# Patient Record
Sex: FEMALE | Race: OTHER | HISPANIC OR LATINO | URBAN - METROPOLITAN AREA
[De-identification: names, ages, dates, MRNs, and addresses within clinical notes are randomized per-mention and may not be internally consistent; named-entity substitution may affect disease eponyms.]

---

## 2024-10-15 ENCOUNTER — APPOINTMENT (OUTPATIENT)
Dept: OBGYN | Facility: CLINIC | Age: 39
End: 2024-10-15
Payer: MEDICAID

## 2024-10-15 ENCOUNTER — INITIAL PRENATAL (OUTPATIENT)
Dept: OBGYN | Facility: CLINIC | Age: 39
End: 2024-10-15

## 2024-10-15 ENCOUNTER — HOSPITAL ENCOUNTER (OUTPATIENT)
Facility: MEDICAL CENTER | Age: 39
End: 2024-10-15
Attending: MIDWIFE
Payer: COMMERCIAL

## 2024-10-15 ENCOUNTER — ANCILLARY PROCEDURE (OUTPATIENT)
Dept: OBGYN | Facility: CLINIC | Age: 39
End: 2024-10-15
Payer: MEDICAID

## 2024-10-15 VITALS — SYSTOLIC BLOOD PRESSURE: 116 MMHG | WEIGHT: 154 LBS | DIASTOLIC BLOOD PRESSURE: 66 MMHG

## 2024-10-15 DIAGNOSIS — O23.599 BACTERIAL VAGINOSIS IN PREGNANCY: ICD-10-CM

## 2024-10-15 DIAGNOSIS — O26.892 VAGINAL DISCHARGE DURING PREGNANCY IN SECOND TRIMESTER: ICD-10-CM

## 2024-10-15 DIAGNOSIS — N91.2 AMENORRHEA, UNSPECIFIED: ICD-10-CM

## 2024-10-15 DIAGNOSIS — O09.529 ANTEPARTUM MULTIGRAVIDA OF ADVANCED MATERNAL AGE: ICD-10-CM

## 2024-10-15 DIAGNOSIS — Z34.80 SUPERVISION OF OTHER NORMAL PREGNANCY, ANTEPARTUM: ICD-10-CM

## 2024-10-15 DIAGNOSIS — N89.8 VAGINAL DISCHARGE DURING PREGNANCY IN SECOND TRIMESTER: ICD-10-CM

## 2024-10-15 DIAGNOSIS — B96.89 BACTERIAL VAGINOSIS IN PREGNANCY: ICD-10-CM

## 2024-10-15 DIAGNOSIS — H91.93 DEAF, BILATERAL: ICD-10-CM

## 2024-10-15 LAB
ABO GROUP BLD: NORMAL
BLD GP AB SCN SERPL QL: NORMAL
ERYTHROCYTE [DISTWIDTH] IN BLOOD BY AUTOMATED COUNT: 47.1 FL (ref 35.9–50)
HBV SURFACE AG SER QL: NORMAL
HCT VFR BLD AUTO: 40.9 % (ref 37–47)
HCV AB SER QL: NORMAL
HGB BLD-MCNC: 13.6 G/DL (ref 12–16)
HIV 1+2 AB+HIV1 P24 AG SERPL QL IA: NORMAL
MCH RBC QN AUTO: 29.2 PG (ref 27–33)
MCHC RBC AUTO-ENTMCNC: 33.3 G/DL (ref 32.2–35.5)
MCV RBC AUTO: 88 FL (ref 81.4–97.8)
PLATELET # BLD AUTO: 335 K/UL (ref 164–446)
PMV BLD AUTO: 10.8 FL (ref 9–12.9)
RBC # BLD AUTO: 4.65 M/UL (ref 4.2–5.4)
RH BLD: NORMAL
RUBV AB SER QL: 379 IU/ML
T PALLIDUM AB SER QL IA: NORMAL
WBC # BLD AUTO: 10.6 K/UL (ref 4.8–10.8)

## 2024-10-15 PROCEDURE — 0500F INITIAL PRENATAL CARE VISIT: CPT | Performed by: MIDWIFE

## 2024-10-15 PROCEDURE — 3074F SYST BP LT 130 MM HG: CPT | Performed by: MIDWIFE

## 2024-10-15 PROCEDURE — 8198 PREG CTR PKG RATE (SYSTEM): Performed by: MIDWIFE

## 2024-10-15 PROCEDURE — 3078F DIAST BP <80 MM HG: CPT | Performed by: MIDWIFE

## 2024-10-15 PROCEDURE — 76815 OB US LIMITED FETUS(S): CPT | Performed by: OBSTETRICS & GYNECOLOGY

## 2024-10-15 RX ORDER — ASPIRIN 81 MG/1
81 TABLET, CHEWABLE ORAL DAILY
Qty: 100 TABLET | Refills: 3 | Status: SHIPPED | OUTPATIENT
Start: 2024-10-15

## 2024-10-15 ASSESSMENT — EDINBURGH POSTNATAL DEPRESSION SCALE (EPDS)
TOTAL SCORE: 1
THE THOUGHT OF HARMING MYSELF HAS OCCURRED TO ME: NEVER
I HAVE LOOKED FORWARD WITH ENJOYMENT TO THINGS: AS MUCH AS I EVER DID
THINGS HAVE BEEN GETTING ON TOP OF ME: NO, I HAVE BEEN COPING AS WELL AS EVER
I HAVE BLAMED MYSELF UNNECESSARILY WHEN THINGS WENT WRONG: NO, NEVER
I HAVE FELT SCARED OR PANICKY FOR NO GOOD REASON: NO, NOT AT ALL
I HAVE BEEN SO UNHAPPY THAT I HAVE BEEN CRYING: NO, NEVER
I HAVE FELT SAD OR MISERABLE: NO, NOT AT ALL
I HAVE BEEN SO UNHAPPY THAT I HAVE HAD DIFFICULTY SLEEPING: NOT VERY OFTEN
I HAVE BEEN ANXIOUS OR WORRIED FOR NO GOOD REASON: NO, NOT AT ALL
I HAVE BEEN ABLE TO LAUGH AND SEE THE FUNNY SIDE OF THINGS: AS MUCH AS I ALWAYS COULD

## 2024-10-16 DIAGNOSIS — Z34.80 SUPERVISION OF OTHER NORMAL PREGNANCY, ANTEPARTUM: ICD-10-CM

## 2024-10-16 DIAGNOSIS — O26.892 VAGINAL DISCHARGE DURING PREGNANCY IN SECOND TRIMESTER: ICD-10-CM

## 2024-10-16 DIAGNOSIS — N89.8 VAGINAL DISCHARGE DURING PREGNANCY IN SECOND TRIMESTER: ICD-10-CM

## 2024-10-16 LAB
C TRACH DNA GENITAL QL NAA+PROBE: NEGATIVE
CANDIDA DNA VAG QL PROBE+SIG AMP: NEGATIVE
G VAGINALIS DNA VAG QL PROBE+SIG AMP: POSITIVE
N GONORRHOEA DNA GENITAL QL NAA+PROBE: NEGATIVE
SPECIMEN SOURCE: NORMAL
T VAGINALIS DNA VAG QL PROBE+SIG AMP: NEGATIVE

## 2024-10-16 RX ORDER — METRONIDAZOLE 500 MG/1
500 TABLET ORAL 2 TIMES DAILY
Qty: 14 TABLET | Refills: 0 | Status: SHIPPED | OUTPATIENT
Start: 2024-10-16 | End: 2024-10-23

## 2024-10-17 ENCOUNTER — TELEPHONE (OUTPATIENT)
Dept: OBGYN | Facility: CLINIC | Age: 39
End: 2024-10-17

## 2024-10-17 LAB
# FETUSES US: NORMAL
AFP MOM SERPL: 0.83
AFP SERPL-MCNC: 29 NG/ML
AGE - REPORTED: 40.1 YR
AMPHET CTO UR CFM-MCNC: NEGATIVE NG/ML
BARBITURATES CTO UR CFM-MCNC: NEGATIVE NG/ML
BENZODIAZ CTO UR CFM-MCNC: NEGATIVE NG/ML
CANNABINOIDS CTO UR CFM-MCNC: NEGATIVE NG/ML
COCAINE CTO UR CFM-MCNC: NEGATIVE NG/ML
CREAT UR-MCNC: 62 MG/DL (ref 20–400)
CURRENT SMOKER: NO
DRUG COMMENT 753798: NORMAL
FAMILY MEMBER DISEASES HX: NO
GA METHOD: NORMAL
GA: NORMAL WK
IDDM PATIENT QL: NO
INTEGRATED SCN PATIENT-IMP: NORMAL
METHADONE CTO UR CFM-MCNC: NEGATIVE NG/ML
OPIATES CTO UR CFM-MCNC: NEGATIVE NG/ML
PCP CTO UR CFM-MCNC: NEGATIVE NG/ML
PROPOXYPH CTO UR CFM-MCNC: NEGATIVE NG/ML
SPECIMEN DRAWN SERPL: NORMAL

## 2024-10-19 LAB
BACTERIA UR CULT: NORMAL
SIGNIFICANT IND 70042: NORMAL
SITE SITE: NORMAL
SOURCE SOURCE: NORMAL

## 2024-10-25 LAB
HPV I/H RISK 1 DNA SPEC QL NAA+PROBE: NOT DETECTED
SPECIMEN SOURCE: NORMAL
THINPREP PAP, CYTOLOGY NL11781: NORMAL

## 2024-11-11 ENCOUNTER — OFFICE VISIT (OUTPATIENT)
Dept: MATERNAL FETAL MEDICINE | Facility: MEDICAL CENTER | Age: 39
End: 2024-11-11
Payer: MEDICAID

## 2024-11-11 ENCOUNTER — ANCILLARY PROCEDURE (OUTPATIENT)
Dept: MATERNAL FETAL MEDICINE | Facility: MEDICAL CENTER | Age: 39
End: 2024-11-11
Payer: MEDICAID

## 2024-11-11 VITALS — HEART RATE: 86 BPM | SYSTOLIC BLOOD PRESSURE: 105 MMHG | DIASTOLIC BLOOD PRESSURE: 69 MMHG | WEIGHT: 157.4 LBS

## 2024-11-11 DIAGNOSIS — H91.93 DEAF, BILATERAL: ICD-10-CM

## 2024-11-11 DIAGNOSIS — O09.522 AMA (ADVANCED MATERNAL AGE) MULTIGRAVIDA 35+, SECOND TRIMESTER: ICD-10-CM

## 2024-11-14 ENCOUNTER — ANCILLARY PROCEDURE (OUTPATIENT)
Dept: OBGYN | Facility: CLINIC | Age: 39
End: 2024-11-14
Payer: MEDICAID

## 2024-11-14 VITALS — HEART RATE: 99 BPM | WEIGHT: 158.2 LBS | SYSTOLIC BLOOD PRESSURE: 99 MMHG | DIASTOLIC BLOOD PRESSURE: 53 MMHG

## 2024-11-14 DIAGNOSIS — O09.522 AMA (ADVANCED MATERNAL AGE) MULTIGRAVIDA 35+, SECOND TRIMESTER: ICD-10-CM

## 2024-11-14 PROCEDURE — 76811 OB US DETAILED SNGL FETUS: CPT | Performed by: OBSTETRICS & GYNECOLOGY

## 2024-11-18 ENCOUNTER — ROUTINE PRENATAL (OUTPATIENT)
Dept: OBGYN | Facility: CLINIC | Age: 39
End: 2024-11-18
Payer: MEDICAID

## 2024-11-18 VITALS — SYSTOLIC BLOOD PRESSURE: 94 MMHG | WEIGHT: 160.8 LBS | DIASTOLIC BLOOD PRESSURE: 54 MMHG

## 2024-11-18 DIAGNOSIS — O09.529 ANTEPARTUM MULTIGRAVIDA OF ADVANCED MATERNAL AGE: ICD-10-CM

## 2024-11-18 PROCEDURE — 0502F SUBSEQUENT PRENATAL CARE: CPT | Performed by: NURSE PRACTITIONER

## 2024-11-18 PROCEDURE — 3078F DIAST BP <80 MM HG: CPT | Performed by: NURSE PRACTITIONER

## 2024-11-18 PROCEDURE — 3074F SYST BP LT 130 MM HG: CPT | Performed by: NURSE PRACTITIONER

## 2024-11-18 NOTE — PROGRESS NOTES
Pt. Here for OB/FU. Reports Good FM.   Good # [phone]   Pt states having some nausea and vomiting.   Pt Kirsten [place] who communicates with pt states would like to speak to her Mom Filomena to see if pt will receive the flu vaccine.

## 2024-12-16 ENCOUNTER — ROUTINE PRENATAL (OUTPATIENT)
Dept: OBGYN | Facility: CLINIC | Age: 39
End: 2024-12-16
Payer: MEDICAID

## 2024-12-16 ENCOUNTER — HOSPITAL ENCOUNTER (OUTPATIENT)
Dept: LAB | Facility: MEDICAL CENTER | Age: 39
End: 2024-12-16
Attending: NURSE PRACTITIONER
Payer: COMMERCIAL

## 2024-12-16 VITALS — WEIGHT: 165 LBS | DIASTOLIC BLOOD PRESSURE: 54 MMHG | SYSTOLIC BLOOD PRESSURE: 100 MMHG

## 2024-12-16 DIAGNOSIS — O09.529 ANTEPARTUM MULTIGRAVIDA OF ADVANCED MATERNAL AGE: ICD-10-CM

## 2024-12-16 DIAGNOSIS — H91.93 DEAF, BILATERAL: ICD-10-CM

## 2024-12-16 LAB
ERYTHROCYTE [DISTWIDTH] IN BLOOD BY AUTOMATED COUNT: 49.4 FL (ref 35.9–50)
GLUCOSE 1H P 50 G GLC PO SERPL-MCNC: 108 MG/DL (ref 70–139)
HCT VFR BLD AUTO: 35.4 % (ref 37–47)
HGB BLD-MCNC: 11.6 G/DL (ref 12–16)
MCH RBC QN AUTO: 30.3 PG (ref 27–33)
MCHC RBC AUTO-ENTMCNC: 32.8 G/DL (ref 32.2–35.5)
MCV RBC AUTO: 92.4 FL (ref 81.4–97.8)
PLATELET # BLD AUTO: 360 K/UL (ref 164–446)
PMV BLD AUTO: 10.9 FL (ref 9–12.9)
RBC # BLD AUTO: 3.83 M/UL (ref 4.2–5.4)
T PALLIDUM AB SER QL IA: NONREACTIVE
WBC # BLD AUTO: 11.3 K/UL (ref 4.8–10.8)

## 2024-12-16 PROCEDURE — 3078F DIAST BP <80 MM HG: CPT | Performed by: NURSE PRACTITIONER

## 2024-12-16 PROCEDURE — 0502F SUBSEQUENT PRENATAL CARE: CPT | Performed by: NURSE PRACTITIONER

## 2024-12-16 PROCEDURE — 3074F SYST BP LT 130 MM HG: CPT | Performed by: NURSE PRACTITIONER

## 2024-12-16 NOTE — PROGRESS NOTES
OB follow up   + fetal movement.  No VB, LOF or UC's.  Phone # [phone]  Preferred pharmacy confirmed.  NIPT not done.

## 2025-01-06 ENCOUNTER — ROUTINE PRENATAL (OUTPATIENT)
Dept: OBGYN | Facility: CLINIC | Age: 40
End: 2025-01-06

## 2025-01-06 VITALS — WEIGHT: 168 LBS | DIASTOLIC BLOOD PRESSURE: 52 MMHG | SYSTOLIC BLOOD PRESSURE: 98 MMHG

## 2025-01-06 DIAGNOSIS — O09.529 ANTEPARTUM MULTIGRAVIDA OF ADVANCED MATERNAL AGE: ICD-10-CM

## 2025-01-06 PROCEDURE — 90471 IMMUNIZATION ADMIN: CPT | Performed by: NURSE PRACTITIONER

## 2025-01-06 PROCEDURE — 90715 TDAP VACCINE 7 YRS/> IM: CPT | Mod: JZ | Performed by: NURSE PRACTITIONER

## 2025-01-06 PROCEDURE — 0502F SUBSEQUENT PRENATAL CARE: CPT | Performed by: NURSE PRACTITIONER

## 2025-01-06 NOTE — PROGRESS NOTES
OB follow up   + fetal movement.  No VB, LOF or UC's.  Phone # [phone]  Preferred pharmacy confirmed.  TDap today  Kick count sheet given today.  BTL signed today

## 2025-01-06 NOTE — LETTER
Cuente los Movimientos de ruth Bebé  Otro paso importante para la tim de ruth bebé    Cherelle Roopa Kimbleicia Steven     [facility]            Dept: [phone]    ¿Cuántas semanas tiene de embarazo? 28w1d    Fecha cuando tiene que comenzar a contar el movimiento: 1/6/25                  Adrianne debe usar lisa diagrama    Elenita manera en que ruth doctor puede controlar a tim de ruth bebé es sabiendo cuantas veces se mueve ruth bebé en el útero, o por medio de las “pataditas”.  Usted podrá ayudarle a ruth médico al usar cada día el siguiente diagrama.    Cada día, usted debe prestar atención a cuantas horas le lleva a ruth bebé moverse 10 veces.  Comience a contar en la mañana, lo antes posible después de haberse levantado.    Primeramente, escriba la hora en que se mueve ruth bebé, hasta llegar a 10 veces.  Colóquele un check o palomita a cada cuadrito cada vez que ruth bebé se mueva hasta que complete 10 veces.  Escriba la hora cuando termine de contar 10 veces en la última columna.  Sume el total del tiempo que le llevó contar los 10 movimientos.  Finalmente, complete el cuadrito de cuantas horas le llevó hacerlo.    Después de howard contado los 10 movimientos, ya no tendrá que contar los demás movimientos por el nubia del día.  A la mañana siguiente, comience a contar de nuevo cuantas veces se mueve el bebé desde el momento en que se levante.    ¿Qué tendría que considerarse un “movimiento”?  Es difícil de decirlo porque es distinto de elenita madre a otra, y de un embarazo a otro.  Lo importante es que cuente el movimiento de la misma manera sade el transcurso de ruth embarazo.  Si tiene preguntas adicionales, pregúntele a ruth doctor.    ¡Cuente cuidadosamente cada día!     MUESTRA:  Semana 28    ¿Cuántas horas le ha llevado sentir 10 movimientos?        Hora de Inicio     1     2     3     4     5     6     7     8     9     10   Hora de Finlizar   Karena. 8:20           11:40   Mar.               Mié.                Jue.               Vie.               Sáb.               Dom.                 IMPORTANTE:  Usted debe contactar a ruth doctor si le lleva más de 2 horas sentir 10 movimientos de ruth bebé.    Cada mañana, escriba la hora de inicio y comience a contar los movimientos de ruth bebé.  Hágalo colocándole un check o palomita a cada cuadrito cada vez que sienta un movimiento de ruth bebé.  Cuando haya sentido 10 “pataditas”, escriba la hora en que terminó de contar en la última columna.  Luego, complete en la cajita (arriba de la avelina de check o palomita) el número total de horas que le llevó hacerlo.  Asegúrese de leer completamente las instrucciones en la página anterior.

## 2025-01-18 NOTE — PROGRESS NOTES
S: Pt is a 39 y.o.  at 30w1d gestation here today for routine prenatal care.     Concerns today include:  Denies concerns    Denies: vaginal bleeding, pelvic and abdominal pain, cramping, contractions, leaking of fluid, urinary and vaginal symptoms and headaches, visual changes, epigastric pain    Pt reports fetal movement as Present     O: /60   Wt 172 lb   LMP  (LMP Unknown)    *see prenatal flowsheet*     Labs:     Prenatal labs: Completed and normal  GTT: 108   GBS: Not yet collected  Genetic testing: MSAFP screen negative   STI testing: negative    Ultrasound: none since last visit     A/P:     Problem List Items Addressed This Visit       Supervision of elderly multigravida in third trimester     Pt is a 39 y.o.  at 30w1d gestation here today for routine prenatal care.   Size equal to dates    Discuss  labor and pre-eclampsia precautions.  Discuss FMA and FKCs  Address concerns: denies concerns   GBS Not yet collected  Rh positive  Tdap: Administered 25  Reviewed 3rd tri labs: yes   Unsure of plan for contraception postpartum. Discussed options, will consider and discuss again at future visit.    Encouraged to tour L&D.    Information given on childbirth classes and WIC.  RTC 2 wks.            Other Visit Diagnoses       30 weeks gestation of pregnancy    -  Primary    Relevant Orders    INFLUENZA VACCINE TRI INJ (PF) (Completed)           Injection administered by Medical Assistant under supervision of MD in clinic.

## 2025-01-20 ENCOUNTER — ROUTINE PRENATAL (OUTPATIENT)
Dept: OBGYN | Facility: CLINIC | Age: 40
End: 2025-01-20

## 2025-01-20 VITALS — WEIGHT: 172 LBS | DIASTOLIC BLOOD PRESSURE: 60 MMHG | SYSTOLIC BLOOD PRESSURE: 100 MMHG

## 2025-01-20 DIAGNOSIS — Z3A.30 30 WEEKS GESTATION OF PREGNANCY: Primary | ICD-10-CM

## 2025-01-20 DIAGNOSIS — O09.523 SUPERVISION OF ELDERLY MULTIGRAVIDA IN THIRD TRIMESTER: ICD-10-CM

## 2025-01-20 PROCEDURE — 90656 IIV3 VACC NO PRSV 0.5 ML IM: CPT

## 2025-01-20 PROCEDURE — 0502F SUBSEQUENT PRENATAL CARE: CPT

## 2025-01-20 PROCEDURE — 90471 IMMUNIZATION ADMIN: CPT

## 2025-01-20 NOTE — ASSESSMENT & PLAN NOTE
Pt is a 39 y.o.  at 30w1d gestation here today for routine prenatal care.   Size equal to dates    Discuss  labor and pre-eclampsia precautions.  Discuss FMA and FKCs  Address concerns: denies concerns   GBS Not yet collected  Rh positive  Tdap: Administered 25  Reviewed 3rd tri labs: yes   Unsure of plan for contraception postpartum. Discussed options, will consider and discuss again at future visit.    Encouraged to tour L&D.    Information given on childbirth classes and WIC.  RTC 2 wks.

## 2025-01-20 NOTE — PROGRESS NOTES
Pt here for a OB follow up   GA: 30w 1d   Pt states: no questions or concerns   + fetal movement.  No VB, LOF, UC's.  Phone # [phone]  Pharmacy Verified.  Flu Vaccine: today   Tdap : 01/06/2025

## 2025-01-22 ENCOUNTER — ANCILLARY PROCEDURE (OUTPATIENT)
Dept: OBGYN | Facility: CLINIC | Age: 40
End: 2025-01-22
Attending: NURSE PRACTITIONER
Payer: MEDICAID

## 2025-01-22 VITALS — SYSTOLIC BLOOD PRESSURE: 98 MMHG | WEIGHT: 171.8 LBS | DIASTOLIC BLOOD PRESSURE: 64 MMHG | HEART RATE: 114 BPM

## 2025-01-22 DIAGNOSIS — O09.529 ANTEPARTUM MULTIGRAVIDA OF ADVANCED MATERNAL AGE: ICD-10-CM

## 2025-01-22 PROCEDURE — 76816 OB US FOLLOW-UP PER FETUS: CPT | Performed by: OBSTETRICS & GYNECOLOGY

## 2025-02-02 NOTE — PROGRESS NOTES
S: Pt is a 39 y.o.  at 32w1d gestation here today for routine prenatal care.     Concerns today include:  Denies concerns    Denies: vaginal bleeding, pelvic and abdominal pain, cramping, contractions, leaking of fluid, urinary and vaginal symptoms and headaches, visual changes, epigastric pain    Pt reports fetal movement as Present     O: /68   Wt 173 lb   LMP  (LMP Unknown)    *see prenatal flowsheet*     Labs:     Prenatal labs: Completed and normal  GTT: 108           GBS: Not yet collected  Genetic testing: MSAFP screen negative   STI testing: negative    Ultrasound: US-OB LIMITED GROWTH FOLLOW UP  Narrative: Indication  ===========    Indication: Advanced Maternal Age (AMA), Multigravida  Indication: Personal History of Other Specified Conditions  Comment: Pt deaf  Fetal Growth Overview  ======================    Exam date     GA        BPD (mm)       HC (mm)          AC (mm)         FL (mm)        HL (mm)        EFW (g)  10/15/2024    16w 2d    32.5    39%    118.2     16%    105.8    59%    20.9    43%                   152     42%  2024    20w 4d    45    14%      170.2     8%     157.1    53%    33.3    36%    32.2    58%    359     41%  2025    30w 3d    78.5    72%    283.5     32%    267    57%      55.8    12%    50.3    26%    1573     38%  Method  =======    Transabdominal ultrasound examination. View: Sufficient  Pregnancy  ==========    Antonio pregnancy. Number of fetuses: 1  Dating  =======    GA by prior assessment 30 w + 3 d  HOMER by prior assessment: 3/30/2025  Ultrasound examination on: 2025  GA by U/S based upon: AC, BPD, Femur, HC  GA by U/S 30 w + 5 d  HOMER by U/S: 3/28/2025  Assigned: based on ultrasound (AC, BPD, Femur, HC), selected on 10/15/2024  Assigned GA 30 w + 3 d  Assigned HOMER: 3/30/2025  General Evaluation  ===================    Cardiac activity present.  bpm. Fetal movements: visualized, visualized. Presentation: cephalic  Placenta:  posterior  Umbilical cord: 3 vessel cord  Amniotic fluid: MVP 6.1 cm. MATT 20.1 cm  Fetal Biometry  ===============    Standard  BPD 78.5 mm 31w 4d 72% Hadlock  .0 mm 32w 2d 91% Juan  .5 mm 31w 1d 32% Hadlock  .0 mm 30w 6d 57% Hadlock  Femur 55.8 mm 29w 3d 12% Hadlock  Humerus 50.3 mm 29w 3d 26% Juan  HC / AC 1.06  EFW 1,573 g 30w 1d 38% Hadlock  EFW (lb) 3 lb  EFW (oz) 7 oz  EFW by: Hadlock (BPD-HC-AC-FL)  Head / Face / Neck  Cephalic index 0.79  41% Nicolaides  Extremities / Bony Struc  FL / BPD 0.71  FL / HC 0.20  FL / AC 0.21  Other Structures   bpm  Fetal Anatomy  ==============    Cranium: normal  Midline falx: normal  Cerebellum: normal  Cisterna magna: normal  Profile: normal  4-chamber view: normal  3-vessel-trachea view: normal  Heart / Thorax  Situs: situs solitus (normal)  Cardiac position: levocardia (normal)  Cardiac axis: normal  Cardiac size: normal (approx. 1/3 of thoracic area)  Cardiac rhythm: regular (normal)  Cardiac function: good contractility (normal)  Diaphragm: normal  Stomach: normal  Kidneys: normal  Bladder: normal  Fetal sex: female  Wants to know fetal sex: yes  Recommendations  ================    Follow-up as clinically indicated.  Impression: Impression  ===========    Single viable IUP with biometry consistent with 30w 3d corresponding to an EDC of 2025  Fetal growth is appropriate.       A/P:     Problem List Items Addressed This Visit       Supervision of elderly multigravida in third trimester     Pt is a 39 y.o.  at 32w1d gestation here today for routine prenatal care.   Size equal to dates    Discuss  labor and pre-eclampsia precautions.  Discuss FMA and FKCs  Address concerns: denies concerns   GBS Not yet collected  Rh positive  Tdap: Administered 25  RTC 2 wks.

## 2025-02-03 ENCOUNTER — APPOINTMENT (OUTPATIENT)
Dept: OBGYN | Facility: CLINIC | Age: 40
End: 2025-02-03
Payer: MEDICAID

## 2025-02-03 VITALS — DIASTOLIC BLOOD PRESSURE: 68 MMHG | WEIGHT: 173 LBS | SYSTOLIC BLOOD PRESSURE: 100 MMHG

## 2025-02-03 DIAGNOSIS — O09.523 SUPERVISION OF ELDERLY MULTIGRAVIDA IN THIRD TRIMESTER: ICD-10-CM

## 2025-02-03 PROCEDURE — 3074F SYST BP LT 130 MM HG: CPT

## 2025-02-03 PROCEDURE — 3078F DIAST BP <80 MM HG: CPT

## 2025-02-03 PROCEDURE — 0502F SUBSEQUENT PRENATAL CARE: CPT

## 2025-02-03 NOTE — ASSESSMENT & PLAN NOTE
Pt is a 39 y.o.  at 32w1d gestation here today for routine prenatal care.   Size equal to dates    Discuss  labor and pre-eclampsia precautions.  Discuss FMA and FKCs  Address concerns: denies concerns   GBS Not yet collected  Rh positive  Tdap: Administered 25  RTC 2 wks.

## 2025-02-03 NOTE — PROGRESS NOTES
Pt here for a OB follow up   GA: 32w 1d     Pt states: no questions or concerns   + fetal movement.  No VB, LOF, UC's.  Phone # [phone]  Pharmacy Verified.  Flu Vaccine: 01/20/25  Tdap : 01/06/25

## 2025-02-20 ENCOUNTER — ROUTINE PRENATAL (OUTPATIENT)
Dept: OBGYN | Facility: CLINIC | Age: 40
End: 2025-02-20
Payer: MEDICAID

## 2025-02-20 VITALS — DIASTOLIC BLOOD PRESSURE: 62 MMHG | WEIGHT: 177 LBS | SYSTOLIC BLOOD PRESSURE: 100 MMHG

## 2025-02-20 DIAGNOSIS — O09.523 SUPERVISION OF ELDERLY MULTIGRAVIDA IN THIRD TRIMESTER: ICD-10-CM

## 2025-02-20 PROCEDURE — 3078F DIAST BP <80 MM HG: CPT

## 2025-02-20 PROCEDURE — 3074F SYST BP LT 130 MM HG: CPT

## 2025-02-20 PROCEDURE — 0502F SUBSEQUENT PRENATAL CARE: CPT

## 2025-02-20 NOTE — PROGRESS NOTES
Pt here for a OB follow up   GA: 34w 4d   Pt states: no questions or concerns   + fetal movement.  No VB, LOF, UC's.  Phone # [phone]  Pharmacy Verified.  Flu Vaccine: 1/20  Tdap : 01/06

## 2025-02-20 NOTE — PROGRESS NOTES
S: Pt is a 40 y.o.  at 34w4d gestation here today for routine prenatal care.     Concerns today include:  Denies concerns    Denies: vaginal bleeding, pelvic and abdominal pain, cramping, contractions, leaking of fluid, urinary and vaginal symptoms and headaches, visual changes, epigastric pain    Pt reports fetal movement as Present     O: /62   Wt 177 lb   LMP  (LMP Unknown)    *see prenatal flowsheet*   BSUS performed so pt able to visualize fetal cardiac activity, breech presentation noted with fetal head in maternal RUQ.     Labs:     Prenatal labs: Completed and normal  GTT: 108   GBS: Not yet collected  Genetic testing: Genetic testing: MSAFP screen negative   STI testing: negative    Ultrasound: none since last visit     A/P:     Problem List Items Addressed This Visit       Supervision of elderly multigravida in third trimester    Pt is a 40 y.o.  at 34w4d gestation here today for routine prenatal care.   Size equal to dates    Discuss  labor and pre-eclampsia precautions.  Discuss FMA and FKCs  Address concerns: no concerns  GBS Not yet collected.  Rh positive  Tdap: Administered 25  Breech exercises given.   Desires IUD for contraception postpartum.   RTC 2 wks with NST.           Breech presentation of fetus

## 2025-02-21 NOTE — ASSESSMENT & PLAN NOTE
Pt is a 40 y.o.  at 34w4d gestation here today for routine prenatal care.   Size equal to dates    Discuss  labor and pre-eclampsia precautions.  Discuss FMA and FKCs  Address concerns: no concerns  GBS Not yet collected.  Rh positive  Tdap: Administered 25  Breech exercises given.   Desires IUD for contraception postpartum.   RTC 2 wks with NST.